# Patient Record
Sex: FEMALE | Race: WHITE | NOT HISPANIC OR LATINO | URBAN - NONMETROPOLITAN AREA
[De-identification: names, ages, dates, MRNs, and addresses within clinical notes are randomized per-mention and may not be internally consistent; named-entity substitution may affect disease eponyms.]

---

## 2020-03-19 ENCOUNTER — IMPORTED ENCOUNTER (OUTPATIENT)
Dept: URBAN - NONMETROPOLITAN AREA CLINIC 1 | Facility: CLINIC | Age: 70
End: 2020-03-19

## 2020-03-19 PROBLEM — H16.223: Noted: 2020-03-19

## 2020-03-19 PROBLEM — H52.4: Noted: 2020-03-19

## 2020-03-19 PROBLEM — H25.13: Noted: 2020-03-19

## 2020-03-19 PROCEDURE — 92015 DETERMINE REFRACTIVE STATE: CPT

## 2020-03-19 PROCEDURE — 99204 OFFICE O/P NEW MOD 45 MIN: CPT

## 2020-03-19 NOTE — PATIENT DISCUSSION
Jane OUDiscussed diagnosis with patient. Reviewed symptoms related to cataract progression. Discussed various treatment options with patient. Recommend cataract evaluation by Dr. Rambo Kasper. Patient defers treatment at this time. Continue to monitor. AVNI OUDiscussed diagnosis in detail with patient. Continue Restasis BID OU. Continue to monitor. Presbyopia OUDiscussed refractive status in detail with patient. New glasses Rx given today. Continue to monitor.

## 2020-07-01 ENCOUNTER — IMPORTED ENCOUNTER (OUTPATIENT)
Dept: URBAN - NONMETROPOLITAN AREA CLINIC 1 | Facility: CLINIC | Age: 70
End: 2020-07-01

## 2020-07-01 PROCEDURE — 92014 COMPRE OPH EXAM EST PT 1/>: CPT

## 2020-07-01 NOTE — PATIENT DISCUSSION
Cataract(s)-Visually significant cataract OU .-Cataract(s) causing symptomatic impairment of visual function not correctable with a tolerable change in glasses or contact lenses lighting or non-operative means resulting in specific activity limitations and/or participation restrictions including but not limited to reading viewing television driving or meeting vocational or recreational needs. -Expectation is clearer vision and functional improvement in symptoms as well as reduced glare disability after cataract removal.-Order IOLMaster and OPD today. -Recommend Standard/Trad  based on today's OPD testing and lifestyle questionnaire.-All questions were answered regarding surgery including pre and post-op medications appointments activity restrictions and anesthetic usage.-The risks benefits and alternatives and special risk factors for the patient were discussed in detail including but not limited to: bleeding infection retinal detachment vitreous loss problems with the implant and possible need for additional surgery.-Although rare the possibility of complete vision loss was discussed.-The possible need for glasses post-operatively was discussed.-Order medical clearance exam based on history of acid refulx and allergies -Patient elects to proceed with cataract surgery OS . Will schedule at patient's convenience and re-evaluate OD  in the future.

## 2020-07-07 ENCOUNTER — IMPORTED ENCOUNTER (OUTPATIENT)
Dept: URBAN - NONMETROPOLITAN AREA CLINIC 1 | Facility: CLINIC | Age: 70
End: 2020-07-07

## 2020-07-07 PROBLEM — Z01.818: Noted: 2020-07-07

## 2020-07-07 PROBLEM — K21.9: Noted: 2020-07-07

## 2020-07-07 PROBLEM — J30.2: Noted: 2020-07-07

## 2020-07-07 PROBLEM — M79.7: Noted: 2020-07-07

## 2020-07-07 PROCEDURE — 99214 OFFICE O/P EST MOD 30 MIN: CPT

## 2022-04-10 ASSESSMENT — VISUAL ACUITY
OS_GLARE: 20/400
OD_GLARE: 20/200
OD_PAM: 20/40
OS_CC: 20/200
OD_GLARE: 20/200
OS_SC: 20/40
OS_CC: 20/30
OS_PH: 20/63
OD_CC: 20/50
OS_GLARE: 20/400
OS_AM: 20/30
OD_SC: 20/40
OD_PH: 20/100
OS_SC: 20/40
OD_SC: 20/40

## 2022-04-10 ASSESSMENT — TONOMETRY
OS_IOP_MMHG: 12
OD_IOP_MMHG: 12
OS_IOP_MMHG: 13
OD_IOP_MMHG: 13